# Patient Record
Sex: FEMALE | Race: BLACK OR AFRICAN AMERICAN
[De-identification: names, ages, dates, MRNs, and addresses within clinical notes are randomized per-mention and may not be internally consistent; named-entity substitution may affect disease eponyms.]

---

## 2018-05-14 ENCOUNTER — HOSPITAL ENCOUNTER (EMERGENCY)
Dept: HOSPITAL 74 - JERFT | Age: 23
Discharge: HOME | End: 2018-05-14
Payer: COMMERCIAL

## 2018-05-14 VITALS — HEART RATE: 69 BPM | TEMPERATURE: 97.8 F | SYSTOLIC BLOOD PRESSURE: 112 MMHG | DIASTOLIC BLOOD PRESSURE: 79 MMHG

## 2018-05-14 VITALS — BODY MASS INDEX: 19.8 KG/M2

## 2018-05-14 DIAGNOSIS — M94.0: Primary | ICD-10-CM

## 2018-05-14 NOTE — PDOC
History of Present Illness





- General


Chief Complaint: Chest Pain


Stated Complaint: CHEST PAIN


Time Seen by Provider: 05/14/18 13:20





- History of Present Illness


Initial Comments: 


22-year-old healthy female without any significant past medical history 

ALLERGIES to penicillin and peanuts presents for evaluation of atraumatic left-

sided chest pain. She states the pains been going on and off for one year 

associated with coughing at night. No radiation of symptoms no other complaints 

no associated symptoms


05/14/18 13:33








Past History





- Past Medical History


Allergies/Adverse Reactions: 


 Allergies











Allergy/AdvReac Type Severity Reaction Status Date / Time


 


peanut Allergy Severe Itching Verified 05/14/18 12:53


 


Penicillins Allergy Severe Rash Verified 05/14/18 12:53











Home Medications: 


Ambulatory Orders





Guaifenesin [Robitussin -] 100 mg PO HS #210 ml 05/14/18 


Ibuprofen [Motrin -] 600 mg PO TID #30 tablet 05/14/18 








Asthma: No


Cancer: No


Cardiac Disorders: No


COPD: No


DVT: No


Diabetes: No


HTN: No


Seizures: No


Thyroid Disease: No





- Suicide/Smoking/Psychosocial Hx


Smoking History: Never smoked


Have you smoked in the past 12 months: No


Number of Cigarettes Smoked Daily: 0


Information on smoking cessation initiated: No


Hx Alcohol Use: No


Drug/Substance Use Hx: No


Substance Use Type: None


Hx Substance Use Treatment: No





**Review of Systems





- Review of Systems


Comments:: 


GENERAL/CONSTITUTIONAL: [No fever or chills. No weakness. No weight change.]


HEAD, EYES, EARS, NOSE AND THROAT: [No change in vision. No ear pain or 

discharge. No sore throat.]


CARDIOVASCULAR: [+ chest pain no shortness of breath.]


RESPIRATORY: [+ cough, no wheezing, or hemoptysis.]


GASTROINTESTINAL: [No nausea, vomiting, diarrhea or constipation. No rectal 

bleeding.]


GENITOURINARY: [No dysuria, frequency, or change in urination.]


MUSCULOSKELETAL: [No joint or muscle swelling or pain. No neck or back pain.]


SKIN AND BREASTS: [No rash or easy bruising.]


NEUROLOGIC: [No headache, vertigo, loss of consciousness, or loss of sensation.]


PSYCHIATRIC: [No depression or anxiety.]


ENDOCRINE: [No increased thirst. No abnormal weight change.]


HEMATOLOGIC/LYMPHATIC: [No anemia, easy bleeding, or history of blood clots.]


ALLERGIC/IMMUNOLOGIC: [No hives or skin allergy. No latex allergy.]


05/14/18 13:34








*Physical Exam





- Vital Signs


 Last Vital Signs











Temp Pulse Resp BP Pulse Ox


 


 97.8 F   69   18   112/79   100 


 


 05/14/18 12:53  05/14/18 12:53  05/14/18 12:53  05/14/18 12:53  05/14/18 12:53














- Physical Exam


Comments: 


GENERAL: [The patient is awake, alert, and fully oriented, in no acute distress.

]


HEAD: [Normal with no signs of trauma.]


EYES: [Pupils equal, round and reactive to light, extraocular movements intact, 

sclera anicteric, conjunctiva clear.]


ENT: [Ears normal, nares patent, oropharynx clear without exudates.  Moist 

mucous membranes.]


NECK: [Normal range of motion, supple without lymphadenopathy, JVD, or masses.]


LUNGS: [Breath sounds equal, clear to auscultation bilaterally.  No wheezes, 

and no crackles.]


HEART: [Regular rate and rhythm, normal S1 and S2 without murmur, rub or 

gallop. There is tenderness about the left upper costochondral junctions]


ABDOMEN: [Soft, nontender, normoactive bowel sounds.  No guarding, no rebound.  

No masses.]


EXTREMITIES: [Normal range of motion, no edema.  No clubbing or cyanosis. No 

cords, erythema, or tenderness.]


NEUROLOGICAL: [Cranial nerves II through XII grossly intact.  Normal speech, 

normal gait.]


PSYCH: [Normal mood, normal affect.]


SKIN: [Warm, Dry, normal turgor, no rashes or lesions noted.]


05/14/18 13:34








Medical Decision Making





- Medical Decision Making


This is very reproducible chest pain with palpation. I'll give her an anti-

inflammatory and cough syrup for nighttime use follow-up with her PCP for 

further evaluation her chest is clear today on exam. Her EKG was reviewed and 

is normal


05/14/18 13:35





05/14/18 13:36








*DC/Admit/Observation/Transfer


Diagnosis at time of Disposition: 


 Costochondral chest pain








- Discharge Dispostion


Disposition: HOME


Condition at time of disposition: Stable


Decision to Admit order: No





- Referrals


Referrals: 


Javier Du MD [Staff Physician] - 





- Patient Instructions


Printed Discharge Instructions:  Costochondritis, DI for Costochondritis


Additional Instructions: 


This is costochondritis pain which is a noncardiac type chest pain. I 

prescribed anti-inflammatories for U as well as cough syrup for nighttime use. 

Return to the emergency room if symptoms worsen or go unresolved prior to follow

-up. In the meantime follow-up with the [facility] in the 

next day or 2





- Post Discharge Activity

## 2018-05-15 NOTE — EKG
Test Reason : 

Blood Pressure : ***/*** mmHG

Vent. Rate : 071 BPM     Atrial Rate : 071 BPM

   P-R Int : 146 ms          QRS Dur : 080 ms

    QT Int : 392 ms       P-R-T Axes : 073 058 043 degrees

   QTc Int : 425 ms

 

NORMAL SINUS RHYTHM WITH SINUS ARRHYTHMIA

NORMAL ECG

NO PREVIOUS ECGS AVAILABLE

Confirmed by MD ANTHONY, JAZMÍN ([id number]) on 5/15/2018 11:56:52 AM

 

Referred By:             Confirmed By:JAZMÍN CRUZ MD

## 2019-02-04 ENCOUNTER — HOSPITAL ENCOUNTER (EMERGENCY)
Dept: HOSPITAL 74 - JERFT | Age: 24
Discharge: HOME | End: 2019-02-04
Payer: COMMERCIAL

## 2020-01-27 ENCOUNTER — HOSPITAL ENCOUNTER (EMERGENCY)
Dept: HOSPITAL 74 - JERFT | Age: 25
Discharge: HOME | End: 2020-01-27
Payer: COMMERCIAL

## 2020-01-27 VITALS — SYSTOLIC BLOOD PRESSURE: 103 MMHG | TEMPERATURE: 100.9 F | HEART RATE: 106 BPM | DIASTOLIC BLOOD PRESSURE: 67 MMHG

## 2020-01-27 VITALS — BODY MASS INDEX: 20.9 KG/M2

## 2020-01-27 DIAGNOSIS — B97.89: ICD-10-CM

## 2020-01-27 DIAGNOSIS — Z88.0: ICD-10-CM

## 2020-01-27 DIAGNOSIS — J06.9: Primary | ICD-10-CM

## 2020-01-27 DIAGNOSIS — Z91.010: ICD-10-CM

## 2020-01-27 NOTE — PDOC
Rapid Medical Evaluation


Time Seen by Provider: 01/27/20 16:26


Medical Evaluation: 


 Allergies











Allergy/AdvReac Type Severity Reaction Status Date / Time


 


peanut Allergy Severe Itching Verified 02/04/19 16:33


 


Penicillins Allergy Severe Rash Verified 02/04/19 16:33











01/27/20 16:27


Pt c/o: fatigue, vomiting, and fever and cough x 1 day


Pt on brief exam: febrile, oropharynx clear


pt ordered for: motrin and flu swab


Pt to proceed to the ED





**Discharge Disposition





- Diagnosis


 Viral syndrome





Fever


Qualifiers:


 Fever type: unspecified Qualified Code(s): R50.9 - Fever, unspecified








- Discharge Dispostion


Disposition: HOME


Condition at time of disposition: Stable





- Prescriptions


Prescriptions: 


Benzonatate [Tessalon Pearls -] 100 mg PO Q8H PRN #20 capsule


 PRN Reason: Cough


Ipratropium Bromide 2 spray NS BID PRN 5 Days #1 spray


 PRN Reason: nasal congestion


Ondansetron [Zofran *Odt*] 4 mg SL Q8H PRN #12 od.tablet


 PRN Reason: nausea


Oseltamivir Phosphate [Tamiflu -] 75 mg PO BID #10 capsule





- Referrals





- Patient Instructions


Printed Discharge Instructions:  DI for Viral Upper Respiratory Infection -- 

Adult


Additional Instructions: 


Your flu test is negative.  Symptoms likely caused by a viral upper respiratory 

infection.  Take prescribed medication as prescribed for symptoms.  Increase 

fluid intake.  Alternate between Tylenol Motrin as needed for fever.  Follow-up 

with primary care as needed





- Post Discharge Activity


Work/School Note:  Back to Work

## 2020-01-27 NOTE — PDOC
History of Present Illness





- General


Chief Complaint: Cold Symptoms


Stated Complaint: FLU LIKE SYMPTOMS


Time Seen by Provider: 01/27/20 16:26


History Source: Patient


Exam Limitations: Clinical Condition





- History of Present Illness


Initial Comments: 





01/27/20 17:30


Patient with no significant past medical history present with complaint of 1 

day history of persistent dry cough, nasal congestion, runny nose, fever, chills

, body aches and nausea.  Denies vomiting, abdominal pain, diarrhea, 

constipation.  Denies any other symptoms.  Patient has not taken anything for 

symptoms.  Denies recent travel or sick contact


Is this a multiple visit Asthma Patient?: No


Timing/Duration: 24 hours





Past History





- Past Medical History


Allergies/Adverse Reactions: 


 Allergies











Allergy/AdvReac Type Severity Reaction Status Date / Time


 


peanut Allergy Severe Itching Verified 02/04/19 16:33


 


Penicillins Allergy Severe Rash Verified 02/04/19 16:33











Home Medications: 


Ambulatory Orders





Doxycycline Hyclate [Vibratab -] 100 mg PO BID #14 tablet 02/04/19 


Benzonatate [Tessalon Pearls -] 100 mg PO Q8H PRN #20 capsule 01/27/20 


Ipratropium Bromide 2 spray NS BID PRN 5 Days #1 spray 01/27/20 


Ondansetron [Zofran *Odt*] 4 mg SL Q8H PRN #12 od.tablet 01/27/20 


Oseltamivir Phosphate [Tamiflu -] 75 mg PO BID #10 capsule 01/27/20 








Asthma: No


Cancer: No


Cardiac Disorders: No


COPD: No


DVT: No


Diabetes: No


HTN: No


Seizures: No


Thyroid Disease: No





- Psycho Social/Smoking Cessation Hx


Smoking History: Never smoked


Have you smoked in the past 12 months: No


Number of Cigarettes Smoked Daily: 0


Information on smoking cessation initiated: No


Hx Alcohol Use: No


Drug/Substance Use Hx: No


Substance Use Type: None


Hx Substance Use Treatment: No





**Review of Systems





- Review of Systems


Able to Perform ROS?: Yes


Is the patient limited English proficient: No


Constitutional: Yes: Chills, Fever, Malaise


HEENTM: Yes: Symptoms Reported, See HPI, Nose Congestion.  No: Eye Pain, 

Blurred Vision, Tearing, Recent change in vision, Double Vision, Cataracts, Ear 

Pain, Ocular Prothesis, Ear Discharge, Nose Pain, Tinnitus, Nose Bleeding, 

Hearing Loss, Throat Pain, Throat Swelling, Mouth Pain, Dental Problems, 

Difficulty Swallowing, Mouth Swelling, Other


Respiratory: Yes: Symptoms reported, See HPI, Cough.  No: Orthopnea, Shortness 

of Breath, SOB with Exertion, SOB at Rest, Stridor, Wheezing, Productive cough, 

Hemoptysis, Other


Cardiac (ROS): No: Symptoms Reported, See HPI, Chest Pain, Edema, Irregular 

Heart Rate, Lightheadedness, Palpitations, Syncope, Chest Tightness, Other


ABD/GI: Yes: Symptoms Reported, See HPI, Nausea.  No: Abdominal Distended, Abd. 

Pain w/ defecation, Constipated, Diarrhea, Difficulty Swallowing, Rectal 

Bleeding, Vomiting, Abdominal cramping


: No: Symptoms Reported


Musculoskeletal: No: Symptoms Reported


All Other Systems: Reviewed and Negative





*Physical Exam





- Vital Signs


 Last Vital Signs











Temp Pulse Resp BP Pulse Ox


 


 100.9 F H  106 H  20   103/67   97 


 


 01/27/20 16:28  01/27/20 16:28  01/27/20 16:28  01/27/20 16:28  01/27/20 16:28














- Physical Exam





01/27/20 17:32


GENERAL:


Well developed, well nourished. Awake and alert. No acute distress.


HEENT:  Normocephalic, atraumatic. PERRLA, EOMI. No conjunctival pallor. Sclera 

are non-icteric. Moist mucous membranes. Oropharynx is clear.


NECK: 


Supple. Full ROM. 


CARDIOVASCULAR:


Regular rate and rhythm. No murmurs, rubs, or gallops. Distal pulses are 2+ and 

symmetric. 


PULMONARY: 


No evidence of respiratory distress. Lungs clear to auscultation bilaterally. 

No wheezing, rales or rhonchi.


ABDOMINAL:


Soft. Non-tender. Non-distended. No rebound or guarding. No organomegaly. 

Normoactive bowel sounds. 


MUSCULOSKELETAL 


Normal range of motion at all joints. 


SKIN: 


Warm and dry. Normal capillary refill. No rashes. No cyanosis. 


NEUROLOGICAL: 


Alert, awake, appropriate.  Gait is normal without ataxia.


PSYCHIATRIC: 


Cooperative. Good eye contact. Appropriate mood


General Appearance: Yes: Nourished, Appropriately Dressed.  No: Apparent 

Distress





ED Treatment Course





- Medications


Given in the ED: 


ED Medications














Discontinued Medications














Generic Name Dose Route Start Last Admin





  Trade Name Freq  PRN Reason Stop Dose Admin


 


Ibuprofen  600 mg  01/27/20 16:31  01/27/20 17:21





  Motrin -  PO  01/27/20 16:32  600 mg





  ONCE ONE   Administration





     





     





     





     














Medical Decision Making





- Medical Decision Making





01/27/20 17:31


Patient with no significant past medical history present with complaint of 1 

day history of persistent dry cough, nasal congestion, runny nose, fever, chills

, body aches and nausea.  Denies vomiting, abdominal pain, diarrhea, 

constipation.  Denies any other symptoms.  Patient has not taken anything for 

symptoms.  Denies recent travel or sick contact


Exam significant for fever of 100.9 F.  Lungs clear to auscultation bilateral 

patient no acute distress.  Patient symptoms likely viral URI with viral 

syndrome.  Rapid flu negative for influenza.  Patient stable for outpatient 

management on Tessalon Perles as needed for cough and Atrovent for nasal 

congestion and Zofran PRN for nausea with advised to increase fluid intake and 

alternate between Tylenol Motrin as needed for fever with PCP follow-up





Discharge





- Discharge Information


Problems reviewed: Yes


Clinical Impression/Diagnosis: 


 Viral syndrome





Fever


Qualifiers:


 Fever type: unspecified Qualified Code(s): R50.9 - Fever, unspecified





Condition: Stable


Disposition: HOME





- Admission


No





- Additional Discharge Information


Prescriptions: 


Benzonatate [Tessalon Pearls -] 100 mg PO Q8H PRN #20 capsule


 PRN Reason: Cough


Ipratropium Bromide 2 spray NS BID PRN 5 Days #1 spray


 PRN Reason: nasal congestion


Ondansetron [Zofran *Odt*] 4 mg SL Q8H PRN #12 od.tablet


 PRN Reason: nausea


Oseltamivir Phosphate [Tamiflu -] 75 mg PO BID #10 capsule





- Follow up/Referral





- Patient Discharge Instructions


Patient Printed Discharge Instructions:  DI for Viral Upper Respiratory 

Infection -- Adult


Additional Instructions: 


Your flu test is negative.  Symptoms likely caused by a viral upper respiratory 

infection.  Take prescribed medication as prescribed for symptoms.  Increase 

fluid intake.  Alternate between Tylenol Motrin as needed for fever.  Follow-up 

with primary care as needed





- Post Discharge Activity


Work/Back to School Note:  Back to Work

## 2020-02-24 ENCOUNTER — HOSPITAL ENCOUNTER (EMERGENCY)
Dept: HOSPITAL 74 - JERFT | Age: 25
Discharge: HOME | End: 2020-02-24
Payer: COMMERCIAL

## 2020-02-24 VITALS — BODY MASS INDEX: 20.9 KG/M2

## 2020-02-24 VITALS — HEART RATE: 82 BPM | SYSTOLIC BLOOD PRESSURE: 117 MMHG | DIASTOLIC BLOOD PRESSURE: 82 MMHG | TEMPERATURE: 98.1 F

## 2020-02-24 DIAGNOSIS — L50.0: Primary | ICD-10-CM

## 2020-02-24 PROCEDURE — 3E0233Z INTRODUCTION OF ANTI-INFLAMMATORY INTO MUSCLE, PERCUTANEOUS APPROACH: ICD-10-PCS | Performed by: STUDENT IN AN ORGANIZED HEALTH CARE EDUCATION/TRAINING PROGRAM

## 2020-02-24 NOTE — PDOC
History of Present Illness





- General


Chief Complaint: Rash


Stated Complaint: ALLERGIC REACTION


Time Seen by Provider: 02/24/20 09:27


History Source: Patient


Exam Limitations: Clinical Condition





- History of Present Illness


Initial Comments: 





02/24/20 09:57


Patient with no significant past medical history present with complaint of 

whole body itching and rash to bilateral side of face and neck area status post 

using glider of her bed to party yesterday which she started to break out.  

Patient reported using Benadryl yesterday which helped with her symptoms rash 

went away but started using a new soap last night and started breaking out 

again.  Denies choking sensation, tongue or lip swelling.  Denies shortness of 

breath.  Patient has not taken anything today for symptoms.  Reported no rash 

to rest of body but have low body itching.  Denies any other symptoms





Is this a multiple visit Asthma Patient?: No


Timing/Duration: 24 hours





Past History





- Past Medical History


Allergies/Adverse Reactions: 


 Allergies











Allergy/AdvReac Type Severity Reaction Status Date / Time


 


peanut Allergy Severe Itching Verified 02/04/19 16:33


 


Penicillins Allergy Severe Rash Verified 02/04/19 16:33











Home Medications: 


Ambulatory Orders





Doxycycline Hyclate [Vibratab -] 100 mg PO BID #14 tablet 02/04/19 


Benzonatate [Tessalon Pearls -] 100 mg PO Q8H PRN #20 capsule 01/27/20 


Ipratropium Bromide 2 spray NS BID PRN 5 Days #1 spray 01/27/20 


Ondansetron [Zofran *Odt*] 4 mg SL Q8H PRN #12 od.tablet 01/27/20 


Oseltamivir Phosphate [Tamiflu -] 75 mg PO BID #10 capsule 01/27/20 


Famotidine [Pepcid -] 20 mg PO BID 5 Days #10 tablet 02/24/20 


predniSONE [Deltasone -] 20 mg PO BID 5 Days #10 tablet 02/24/20 








Asthma: No


Cancer: No


Cardiac Disorders: No


COPD: No


DVT: No


Diabetes: No


HTN: No


Seizures: No


Thyroid Disease: No





- Psycho Social/Smoking Cessation Hx


Smoking History: Never smoked


Have you smoked in the past 12 months: No


Number of Cigarettes Smoked Daily: 0


Information on smoking cessation initiated: No


Hx Alcohol Use: No


Drug/Substance Use Hx: No


Substance Use Type: None


Hx Substance Use Treatment: No





**Review of Systems





- Review of Systems


Able to Perform ROS?: Yes


Is the patient limited English proficient: No


Constitutional: No: Chills, Fever, Malaise


HEENTM: No: Symptoms Reported, See HPI, Eye Pain, Blurred Vision, Tearing, 

Recent change in vision, Double Vision, Cataracts, Ear Pain, Ocular Prothesis, 

Ear Discharge, Nose Pain, Nose Congestion, Tinnitus, Nose Bleeding, Hearing Loss

, Throat Pain, Throat Swelling, Mouth Pain, Dental Problems, Difficulty 

Swallowing, Mouth Swelling, Other


Respiratory: No: Symptoms reported, See HPI, Cough, Orthopnea, Shortness of 

Breath, SOB with Exertion, SOB at Rest, Stridor, Wheezing, Productive cough, 

Hemoptysis, Other


Cardiac (ROS): No: Symptoms Reported, See HPI, Chest Pain, Edema, Irregular 

Heart Rate, Lightheadedness, Palpitations, Syncope, Chest Tightness, Other


ABD/GI: No: Symptoms Reported, Nausea, Vomiting


Integumentary: Yes: Symptoms Reported, See HPI, Pruritus (all over the body), 

Rash (face and anterior neck)


All Other Systems: Reviewed and Negative





*Physical Exam





- Vital Signs


 Last Vital Signs











Temp Pulse Resp BP Pulse Ox


 


 98.1 F   82   18   117/82   99 


 


 02/24/20 09:24  02/24/20 09:24  02/24/20 09:24  02/24/20 09:24  02/24/20 09:24














- Physical Exam





02/24/20 10:12


GENERAL:


Well developed, well nourished. Awake and alert. No acute distress.


HEENT:  Normocephalic, atraumatic. PERRLA, EOMI. No conjunctival pallor. Sclera 

are non-icteric. Moist mucous membranes. Oropharynx is clear.


NECK: 


Supple. Full ROM. 


CARDIOVASCULAR:


Regular rate and rhythm. No murmurs, rubs, or gallops. Distal pulses are 2+ and 

symmetric. 


PULMONARY: 


No evidence of respiratory distress. Lungs clear to auscultation bilaterally. 

No wheezing, rales or rhonchi.


MUSCULOSKELETAL 


Normal range of motion at all joints. 


SKIN: 


Warm and dry. Normal capillary refill.  Diffuse urticarial rash to anterior 

neck and bilateral face on her cheeks 


NEUROLOGICAL: 


Alert, awake, appropriate.  Gait is normal without ataxia.


PSYCHIATRIC: 


Cooperative. Good eye contact. Appropriate mood


General Appearance: Yes: Nourished, Appropriately Dressed.  No: Apparent 

Distress





Medical Decision Making





- Medical Decision Making





02/24/20 10:10


Patient with no significant past medical history present with complaint of 

whole body itching and rash to bilateral side of face and neck area status post 

using glider of her bed to party yesterday which she started to break out.  

Patient reported using Benadryl yesterday which helped with her symptoms rash 

went away but started using a new soap last night and started breaking out 

again.  Denies choking sensation, tongue or lip swelling.  Denies shortness of 

breath.  Patient has not taken anything today for symptoms.  Reported no rash 

to rest of body but have low body itching.  Denies any other symptoms


Exams significant for localized urticarial rash to anterior neck and bilateral 

cheeks without excoriations.  Oropharynx normal and patent.  Patient in no 

acute distress.  No rash to the rest of the body.


Symptoms likely allergic dermatitis.  Decadron 10 mg IM and Pepcid 40 mg p.o. 

ordered for allergic reaction.  Observe patient and reassess in 15 minutes


02/24/20 10:27


Patient still with no acute distress no worsening allergic reaction.  Patient 

stable for discharge on Pepcid twice daily for 5 days and prednisone twice 

daily for 5 days with strict follow-up





Discharge





- Discharge Information


Problems reviewed: Yes


Clinical Impression/Diagnosis: 


 Allergic rash present on examination





Condition: Stable


Disposition: HOME





- Admission


No





- Additional Discharge Information


Prescriptions: 


Famotidine [Pepcid -] 20 mg PO BID 5 Days #10 tablet


predniSONE [Deltasone -] 20 mg PO BID 5 Days #10 tablet





- Follow up/Referral


Referrals: 


Sheela Gallagher MD [Primary Care Provider] - 





- Patient Discharge Instructions


Patient Printed Discharge Instructions:  DI for Hives


Additional Instructions: 


Use prescribed medication as prescribed for allergic reaction.  Come back to 

emergency room if worsening allergic reaction including shortness of breath, 

tongue or lip swelling otherwise follow-up with your primary care





- Post Discharge Activity


Work/Back to School Note:  Back to Work